# Patient Record
Sex: MALE | Race: WHITE | Employment: FULL TIME | URBAN - METROPOLITAN AREA
[De-identification: names, ages, dates, MRNs, and addresses within clinical notes are randomized per-mention and may not be internally consistent; named-entity substitution may affect disease eponyms.]

---

## 2017-02-08 DIAGNOSIS — Z98.61 CAD S/P PERCUTANEOUS CORONARY ANGIOPLASTY: Primary | ICD-10-CM

## 2017-02-08 DIAGNOSIS — I25.10 CAD S/P PERCUTANEOUS CORONARY ANGIOPLASTY: Primary | ICD-10-CM

## 2017-02-08 RX ORDER — PRASUGREL 10 MG/1
10 TABLET, FILM COATED ORAL DAILY
Qty: 30 TABLET | Refills: 1 | Status: SHIPPED | OUTPATIENT
Start: 2017-02-08 | End: 2017-03-13 | Stop reason: SDUPTHER

## 2017-02-08 RX ORDER — PRASUGREL HCL 10 MG
TABLET ORAL
Qty: 14 TABLET | Refills: 0 | Status: SHIPPED | OUTPATIENT
Start: 2017-02-08 | End: 2017-02-08 | Stop reason: SDUPTHER

## 2017-02-22 RX ORDER — METOPROLOL SUCCINATE 25 MG/1
TABLET, EXTENDED RELEASE ORAL
Qty: 15 TABLET | Refills: 0 | Status: SHIPPED | OUTPATIENT
Start: 2017-02-22 | End: 2017-03-13 | Stop reason: SDUPTHER

## 2017-03-13 ENCOUNTER — OFFICE VISIT (OUTPATIENT)
Dept: CARDIOLOGY CLINIC | Age: 52
End: 2017-03-13

## 2017-03-13 VITALS
SYSTOLIC BLOOD PRESSURE: 140 MMHG | BODY MASS INDEX: 32.33 KG/M2 | HEART RATE: 100 BPM | HEIGHT: 75 IN | WEIGHT: 260 LBS | DIASTOLIC BLOOD PRESSURE: 80 MMHG

## 2017-03-13 DIAGNOSIS — Z98.61 CAD S/P PERCUTANEOUS CORONARY ANGIOPLASTY: Primary | ICD-10-CM

## 2017-03-13 DIAGNOSIS — I10 ESSENTIAL HYPERTENSION: ICD-10-CM

## 2017-03-13 DIAGNOSIS — I25.10 CAD S/P PERCUTANEOUS CORONARY ANGIOPLASTY: Primary | ICD-10-CM

## 2017-03-13 PROCEDURE — 99214 OFFICE O/P EST MOD 30 MIN: CPT | Performed by: INTERNAL MEDICINE

## 2017-03-13 RX ORDER — ATORVASTATIN CALCIUM 40 MG/1
40 TABLET, FILM COATED ORAL DAILY
Qty: 90 TABLET | Refills: 3 | Status: SHIPPED | OUTPATIENT
Start: 2017-03-13 | End: 2018-04-05 | Stop reason: SDUPTHER

## 2017-03-13 RX ORDER — METOPROLOL SUCCINATE 25 MG/1
25 TABLET, EXTENDED RELEASE ORAL DAILY
Qty: 90 TABLET | Refills: 3 | Status: SHIPPED | OUTPATIENT
Start: 2017-03-13 | End: 2018-01-31 | Stop reason: ALTCHOICE

## 2017-03-13 RX ORDER — ISOSORBIDE MONONITRATE 30 MG/1
30 TABLET, EXTENDED RELEASE ORAL DAILY
Qty: 180 TABLET | Refills: 3 | Status: SHIPPED | OUTPATIENT
Start: 2017-03-13 | End: 2018-01-23

## 2017-03-13 RX ORDER — PRASUGREL 10 MG/1
10 TABLET, FILM COATED ORAL DAILY
Qty: 90 TABLET | Refills: 3 | Status: SHIPPED | OUTPATIENT
Start: 2017-03-13 | End: 2018-01-23 | Stop reason: HOSPADM

## 2017-03-13 RX ORDER — LISINOPRIL 20 MG/1
20 TABLET ORAL DAILY
Qty: 180 TABLET | Refills: 3 | Status: SHIPPED | OUTPATIENT
Start: 2017-03-13 | End: 2018-05-27 | Stop reason: SDUPTHER

## 2017-03-21 ENCOUNTER — TELEPHONE (OUTPATIENT)
Dept: CARDIOLOGY CLINIC | Age: 52
End: 2017-03-21

## 2018-01-15 ENCOUNTER — OFFICE VISIT (OUTPATIENT)
Dept: CARDIOLOGY CLINIC | Age: 53
End: 2018-01-15

## 2018-01-15 VITALS
WEIGHT: 250.2 LBS | SYSTOLIC BLOOD PRESSURE: 130 MMHG | HEART RATE: 58 BPM | BODY MASS INDEX: 31.27 KG/M2 | DIASTOLIC BLOOD PRESSURE: 80 MMHG

## 2018-01-15 DIAGNOSIS — Z98.61 CAD S/P PERCUTANEOUS CORONARY ANGIOPLASTY: Primary | ICD-10-CM

## 2018-01-15 DIAGNOSIS — I10 ESSENTIAL HYPERTENSION: ICD-10-CM

## 2018-01-15 DIAGNOSIS — I25.10 CAD S/P PERCUTANEOUS CORONARY ANGIOPLASTY: Primary | ICD-10-CM

## 2018-01-15 PROCEDURE — 99213 OFFICE O/P EST LOW 20 MIN: CPT | Performed by: INTERNAL MEDICINE

## 2018-01-15 NOTE — PROGRESS NOTES
vitamins capsule Take 1 capsule by mouth daily      Omega-3 Krill Oil 300 MG CAPS Take 300 mg by mouth daily      Cholecalciferol (VITAMIN D3) 2000 UNITS CAPS Take by mouth      Blood Glucose Monitoring Suppl SUPPLIES MISC verio IQ test strips qty 300 test tid 3 refills 300 each 3    glucose blood VI test strips ([employer] BLOOD GLUCOSE TEST) strip As needed. 100 strip 12    aspirin 81 MG EC tablet Take 81 mg by mouth daily.  prasugrel (EFFIENT) 10 MG TABS Take 1 tablet by mouth daily 90 tablet 3    magnesium 30 MG tablet Take 30 mg by mouth daily      metFORMIN (GLUCOPHAGE) 1000 MG tablet TAKE ONE TABLET BY MOUTH TWICE A  tablet 3     No current facility-administered medications for this visit. Vitals  Weight: 250 lb 3.2 oz (113.5 kg)  Blood Pressure:  140 /80  Pulse: 58           No diagnosis found. Review of Systems   Constitutional: Negative. Eyes: Negative. Respiratory:  Negative for apnea, cough, choking, wheezing and stridor. Cardiovascular: Positive for chest pain. Negative for palpitations and leg swelling. Gastrointestinal: Negative. Endocrine: Negative. Genitourinary: Negative. Musculoskeletal: Negative. Skin: Negative. Neurological: Negative. Psychiatric/Behavioral: Negative. Objective:   Physical Exam   Constitutional: He is oriented to person, place, and time. He appears well-developed and well-nourished. HENT:   Head: Normocephalic and atraumatic. Eyes: EOM are normal. Pupils are equal, round, and reactive to light. Neck: Normal range of motion. Neck supple. Cardiovascular: Normal rate and regular rhythm. Pulmonary/Chest: Effort normal and breath sounds normal.   Abdominal: Soft. Bowel sounds are normal. He exhibits no distension and no mass. There is no tenderness. There is no rebound and no guarding. Musculoskeletal: He exhibits no edema or tenderness. Neurological: He is alert and oriented to person, place, and time.

## 2018-01-19 ENCOUNTER — HOSPITAL ENCOUNTER (OUTPATIENT)
Dept: NON INVASIVE DIAGNOSTICS | Age: 53
Discharge: HOME | End: 2018-01-19
Attending: INTERNAL MEDICINE | Admitting: INTERNAL MEDICINE

## 2018-01-19 ENCOUNTER — TELEPHONE (OUTPATIENT)
Dept: CARDIOLOGY CLINIC | Age: 53
End: 2018-01-19

## 2018-01-19 DIAGNOSIS — I10 ESSENTIAL HYPERTENSION: ICD-10-CM

## 2018-01-19 DIAGNOSIS — Z98.61 CAD S/P PERCUTANEOUS CORONARY ANGIOPLASTY: ICD-10-CM

## 2018-01-19 DIAGNOSIS — I25.10 CAD S/P PERCUTANEOUS CORONARY ANGIOPLASTY: ICD-10-CM

## 2018-01-19 DIAGNOSIS — R94.39 ABNORMAL STRESS TEST: Primary | ICD-10-CM

## 2018-01-19 LAB
LV EF: 42 %
LVEF MODALITY: NORMAL

## 2018-01-19 RX ORDER — 0.9 % SODIUM CHLORIDE 0.9 %
10 VIAL (ML) INJECTION PRN
Status: DISCONTINUED | OUTPATIENT
Start: 2018-01-19 | End: 2018-01-20 | Stop reason: HOSPADM

## 2018-01-19 RX ADMIN — Medication 10 ML: at 13:24

## 2018-01-19 NOTE — TELEPHONE ENCOUNTER
Dr. Dav Funes called to inform Dr. Sukh Shine pt stress test is positive. Dr. Sukh Shine ordered left heart cath for 1/23/18 at 0900 and to increase IMDUR 30 mg to BID, and stay off work, letter given. Called and notified pt of orders, bring a list of your medications, pt denies allergies to contrast die and iodine, do not eat or drink anything 8 hours prior to procedure, take all morning medications excerpt for diuretics, check blood sugar in the morning if below 120 drink a glass of juice. stop metformin 2 days before procedure. you must have someone drive you home- no driving for 24 hours after the procedure. Pt verbalized understanding on all instructions.

## 2018-01-23 ENCOUNTER — HOSPITAL ENCOUNTER (OUTPATIENT)
Dept: CARDIAC CATH/INVASIVE PROCEDURES | Age: 53
Discharge: HOME | End: 2018-01-23
Attending: INTERNAL MEDICINE | Admitting: INTERNAL MEDICINE

## 2018-01-23 ENCOUNTER — TELEPHONE (OUTPATIENT)
Dept: CARDIOTHORACIC SURGERY | Age: 53
End: 2018-01-23

## 2018-01-23 VITALS — HEIGHT: 75 IN | TEMPERATURE: 97.3 F | WEIGHT: 244 LBS | BODY MASS INDEX: 30.34 KG/M2

## 2018-01-23 LAB
A/G RATIO: 1.5 (ref 1.1–2.2)
ALBUMIN SERPL-MCNC: 4.3 G/DL (ref 3.4–5)
ALP BLD-CCNC: 100 U/L (ref 40–129)
ALT SERPL-CCNC: 24 U/L (ref 10–40)
ANION GAP SERPL CALCULATED.3IONS-SCNC: 12 MMOL/L (ref 3–16)
AST SERPL-CCNC: 19 U/L (ref 15–37)
BILIRUB SERPL-MCNC: 0.6 MG/DL (ref 0–1)
BUN BLDV-MCNC: 20 MG/DL (ref 7–20)
CALCIUM SERPL-MCNC: 9.4 MG/DL (ref 8.3–10.6)
CHLORIDE BLD-SCNC: 101 MMOL/L (ref 99–110)
CO2: 23 MMOL/L (ref 21–32)
CREAT SERPL-MCNC: 0.7 MG/DL (ref 0.9–1.3)
GFR AFRICAN AMERICAN: >60
GFR NON-AFRICAN AMERICAN: >60
GLOBULIN: 2.9 G/DL
GLUCOSE BLD-MCNC: 221 MG/DL (ref 70–99)
HCT VFR BLD CALC: 42.9 % (ref 40.5–52.5)
HEMOGLOBIN: 14.9 G/DL (ref 13.5–17.5)
INR BLD: 0.96 (ref 0.85–1.15)
MCH RBC QN AUTO: 30.8 PG (ref 26–34)
MCHC RBC AUTO-ENTMCNC: 34.8 G/DL (ref 31–36)
MCV RBC AUTO: 88.5 FL (ref 80–100)
PDW BLD-RTO: 12.8 % (ref 12.4–15.4)
PLATELET # BLD: 267 K/UL (ref 135–450)
PMV BLD AUTO: 8.9 FL (ref 5–10.5)
POTASSIUM REFLEX MAGNESIUM: 4.3 MMOL/L (ref 3.5–5.1)
PROTHROMBIN TIME: 10.9 SEC (ref 9.6–13)
RBC # BLD: 4.85 M/UL (ref 4.2–5.9)
SODIUM BLD-SCNC: 136 MMOL/L (ref 136–145)
TOTAL PROTEIN: 7.2 G/DL (ref 6.4–8.2)
WBC # BLD: 6.4 K/UL (ref 4–11)

## 2018-01-23 PROCEDURE — 99999 PR OFFICE/OUTPT VISIT,PROCEDURE ONLY: CPT | Performed by: INTERNAL MEDICINE

## 2018-01-23 RX ORDER — SODIUM CHLORIDE 9 MG/ML
INJECTION, SOLUTION INTRAVENOUS CONTINUOUS
Status: DISCONTINUED | OUTPATIENT
Start: 2018-01-23 | End: 2018-01-24 | Stop reason: HOSPADM

## 2018-01-23 RX ORDER — SODIUM CHLORIDE 0.9 % (FLUSH) 0.9 %
10 SYRINGE (ML) INJECTION EVERY 12 HOURS SCHEDULED
Status: DISCONTINUED | OUTPATIENT
Start: 2018-01-23 | End: 2018-01-24 | Stop reason: HOSPADM

## 2018-01-23 RX ORDER — SODIUM CHLORIDE 9 MG/ML
INJECTION, SOLUTION INTRAVENOUS CONTINUOUS
Status: ACTIVE | OUTPATIENT
Start: 2018-01-23 | End: 2018-01-23

## 2018-01-23 RX ORDER — ONDANSETRON 2 MG/ML
4 INJECTION INTRAMUSCULAR; INTRAVENOUS EVERY 6 HOURS PRN
Status: DISCONTINUED | OUTPATIENT
Start: 2018-01-23 | End: 2018-01-24 | Stop reason: HOSPADM

## 2018-01-23 RX ORDER — SODIUM CHLORIDE 0.9 % (FLUSH) 0.9 %
10 SYRINGE (ML) INJECTION PRN
Status: DISCONTINUED | OUTPATIENT
Start: 2018-01-23 | End: 2018-01-24 | Stop reason: HOSPADM

## 2018-01-23 RX ORDER — SODIUM CHLORIDE 0.9 % (FLUSH) 0.9 %
10 SYRINGE (ML) INJECTION EVERY 12 HOURS SCHEDULED
Status: DISCONTINUED | OUTPATIENT
Start: 2018-01-23 | End: 2018-01-23 | Stop reason: SDUPTHER

## 2018-01-23 RX ORDER — ISOSORBIDE MONONITRATE 30 MG/1
30 TABLET, EXTENDED RELEASE ORAL 2 TIMES DAILY
Qty: 60 TABLET | Refills: 3 | Status: SHIPPED | OUTPATIENT
Start: 2018-01-23 | End: 2018-03-16 | Stop reason: DRUGHIGH

## 2018-01-23 RX ORDER — ACETAMINOPHEN 325 MG/1
650 TABLET ORAL EVERY 4 HOURS PRN
Status: DISCONTINUED | OUTPATIENT
Start: 2018-01-23 | End: 2018-01-24 | Stop reason: HOSPADM

## 2018-01-23 RX ORDER — SODIUM CHLORIDE 0.9 % (FLUSH) 0.9 %
10 SYRINGE (ML) INJECTION PRN
Status: DISCONTINUED | OUTPATIENT
Start: 2018-01-23 | End: 2018-01-23 | Stop reason: SDUPTHER

## 2018-01-23 RX ADMIN — Medication 325 MG: at 08:42

## 2018-01-23 NOTE — TELEPHONE ENCOUNTER
Pt sched for appt 1/29/18. Info emailed to [email or web address] as requested per pt. Reached out to [employer] for assistance in trying to bring wife from [place] for surgery.   Dorcas Delatorre

## 2018-01-24 NOTE — PLAN OF CARE
Brief Pre-Op Note/Sedation Assessment      Leidy Thomas  1965  Room/bed info not found  [id number]  11:13 AM    Planned Procedure: Cardiac Catheterization Procedure and possible PCi    Post Procedure Plan: Return to same level of care    Consent: I have discussed with the patient and/or the patient representative the indication, alternatives, and the possible risks and/or complications of the planned procedure and the anesthesia methods. The patient and/or patient representative appear to understand and agree to proceed.     Chief Complaint: Chest Pain/Pressure      Indications for the Procedure:   CAD Presentation:  Unstable Angina -  New onset angina (within the past 2 months of at least class III severity)  Anginal Classification within 2 weeks:  CCS III - Symptoms with everyday living activities, i.e., moderate limitation  NYHA Heart Failure Class within 2 weeks: Class II - Symptoms of HF on ordinary exertion       Anti- Anginal Meds within 2 weeks:   ANTI-ANGINAL MEDS: Yes: Beta Blockers      Stress or Imaging Studies Performed:  Stress Test with SPECT Result: Positive:  anterior Risk/Extent of Ischemia:  Intermediate    Vital Signs:  Temp 97.3 °F (36.3 °C) (Oral)   Ht 6' 3\" (1.905 m)   Wt 244 lb (110.7 kg)   BMI 30.50 kg/m²     Allergies:  No Known Allergies    Past Medical History:  Past Medical History:   Diagnosis Date    Hypertension     Type II or unspecified type diabetes mellitus without mention of complication, not stated as uncontrolled          Surgical History:  Past Surgical History:   Procedure Laterality Date    COLONOSCOPY  2011 2016 next    KNEE SURGERY Right 1999    bone chip removed    TONSILLECTOMY AND ADENOIDECTOMY  1970    VASECTOMY  1986         Medications:  Current Outpatient Prescriptions   Medication Sig Dispense Refill    isosorbide mononitrate (IMDUR) 30 MG extended release tablet Take 1 tablet by mouth 2 times daily 60 tablet 3    metoprolol succinate (TOPROL XL)

## 2018-01-25 LAB
EKG ATRIAL RATE: 75 BPM
EKG DIAGNOSIS: NORMAL
EKG P AXIS: 45 DEGREES
EKG P-R INTERVAL: 156 MS
EKG Q-T INTERVAL: 386 MS
EKG QRS DURATION: 102 MS
EKG QTC CALCULATION (BAZETT): 431 MS
EKG R AXIS: -14 DEGREES
EKG T AXIS: 38 DEGREES
EKG VENTRICULAR RATE: 75 BPM

## 2018-01-26 ENCOUNTER — HOSPITAL ENCOUNTER (OUTPATIENT)
Dept: VASCULAR LAB | Age: 53
Discharge: HOME | End: 2018-01-26
Attending: THORACIC SURGERY (CARDIOTHORACIC VASCULAR SURGERY) | Admitting: THORACIC SURGERY (CARDIOTHORACIC VASCULAR SURGERY)

## 2018-01-26 DIAGNOSIS — I25.10 ATHEROSCLEROTIC HEART DISEASE OF NATIVE CORONARY ARTERY WITHOUT ANGINA PECTORIS: ICD-10-CM

## 2018-01-29 ENCOUNTER — TELEPHONE (OUTPATIENT)
Dept: CARDIOLOGY CLINIC | Age: 53
End: 2018-01-29

## 2018-01-29 ENCOUNTER — OFFICE VISIT (OUTPATIENT)
Dept: CARDIOTHORACIC SURGERY | Age: 53
End: 2018-01-29

## 2018-01-29 VITALS
HEIGHT: 75 IN | WEIGHT: 248 LBS | BODY MASS INDEX: 30.84 KG/M2 | OXYGEN SATURATION: 96 % | HEART RATE: 88 BPM | SYSTOLIC BLOOD PRESSURE: 118 MMHG | TEMPERATURE: 97.9 F | DIASTOLIC BLOOD PRESSURE: 80 MMHG

## 2018-01-29 DIAGNOSIS — I25.10 CAD S/P PERCUTANEOUS CORONARY ANGIOPLASTY: ICD-10-CM

## 2018-01-29 DIAGNOSIS — E11.9 TYPE 2 DIABETES MELLITUS WITHOUT COMPLICATION, UNSPECIFIED LONG TERM INSULIN USE STATUS: ICD-10-CM

## 2018-01-29 DIAGNOSIS — Z98.61 CAD S/P PERCUTANEOUS CORONARY ANGIOPLASTY: ICD-10-CM

## 2018-01-29 DIAGNOSIS — I10 ESSENTIAL HYPERTENSION: ICD-10-CM

## 2018-01-29 DIAGNOSIS — I25.110 CORONARY ARTERY DISEASE INVOLVING NATIVE CORONARY ARTERY OF NATIVE HEART WITH UNSTABLE ANGINA PECTORIS (HCC): Primary | ICD-10-CM

## 2018-01-29 PROBLEM — E78.5 HYPERLIPIDEMIA: Status: ACTIVE | Noted: 2018-01-29

## 2018-01-29 PROCEDURE — 99214 OFFICE O/P EST MOD 30 MIN: CPT | Performed by: THORACIC SURGERY (CARDIOTHORACIC VASCULAR SURGERY)

## 2018-01-29 NOTE — TELEPHONE ENCOUNTER
Office received paper work from [employer] regarding disability claim. Please see MEDIA TAB for scanned copy. [employer]  Ph:) [phone]  Fax:) [phone]    Claim Disability [id number]    Original paperwork given to Maricel Moore for completion.

## 2018-01-29 NOTE — PROGRESS NOTES
2/24/16  Yes Az Smyth, DO   LEVEMIR FLEXTOUCH 100 UNIT/ML injection pen INJECT 50 UNITS UNDER THE SKIN NIGHTLY 11/5/15  Yes Az Smyth DO   Blood Glucose Monitoring Suppl SUPPLIES MISC verio IQ test strips qty 300 test tid 3 refills 5/1/14  Yes Az Smyth DO   glucose blood VI test strips ([employer] BLOOD GLUCOSE TEST) strip As needed. 9/11/13  Yes Az Smyth DO   aspirin 81 MG EC tablet Take 81 mg by mouth daily. Yes Historical Provider, MD        Facility Administered Medications: Allergies:  Review of patient's allergies indicates no known allergies. Social History:      Social History     Social History    Marital status:      Spouse name: N/A    Number of children: N/A    Years of education: N/A     Occupational History    Not on file. Social History Main Topics    Smoking status: Never Smoker    Smokeless tobacco: Never Used    Alcohol use 0.0 oz/week      Comment: rarely    Drug use: No    Sexual activity: Yes     Partners: Female     Other Topics Concern    Not on file     Social History Narrative    No narrative on file       Family History:        Problem Relation Age of Onset    Cancer Mother      lung    Diabetes Father     Heart Disease Father     Alzheimer's Disease Maternal Grandmother        Review of Systems:  Constitutional:  No night sweats, headaches, weight loss. Neurological:  No stroke, TIAs, seizures. Psychiatric:  No depression, anxiety. Eyes:  No glaucoma, cataracts. ENT:  No nosebleeds, deviated septum. Integumentary:  No dermatitis, itching, rash. Cardiovascular:  No arrhythmias, previous MI. Respiratory:  No SOB, emphysema, asthma. GI:  No PUD, heartburn. :  No kidney stones, frequent UTIs  Vascular:  No claudication, varicosities. Hematologic:  No bleeding, easy bruising. Immunologic:  No known cancer, steroid therapies. Musculoskeletal:  No arthritis, gout. Endocrine: No diabetes, thyroid issues.     Physical Examination: /80 (Site: Left Arm, Position: Sitting, Cuff Size: Medium Adult)   Pulse 88   Temp 97.9 °F (36.6 °C) (Oral)   Ht 6' 3\" (1.905 m)   Wt 248 lb (112.5 kg)   SpO2 96%   BMI 31.00 kg/m²    BP RUE:  BP LUE:   Admission Weight: 248 lb (112.5 kg)   Hand dominance:    General appearance: NAD  Eyes: PERRLA  Neck: no JVD, no lymphadenopathy. Respiratory: effort is unlabored, no crackles, wheezes or rubs. Cardiovascular: regular, no murmur. No carotid bruits. No edema or varicosities. Abdominal aorta cannot be appreciated given body habitus. Pulses:    carotid brachial radial femoral popliteal DP PT   RIGHT          LEFT          GI: abdomen soft, nondistended, no organomegaly. Musculoskeletal: strength and tone normal.  Extremities: warm and pink. Skin: no dermatitis or ulceration. Neuro/psychiatric: grossly intact. MEDICAL DECISION MAKING/TESTING personally reviewed    Cath:  diagonal significant circumflex PLB disease. There is PDA disease that is mild. The LAD looks like it may have a 50% lesion but only in one view. I'm unable to detect an stenosis in a second view          Labs:   CBC: No results for input(s): WBC, HGB, HCT, MCV, PLT in the last 72 hours. BMP: No results for input(s): NA, K, CL, CO2, PHOS, BUN, CREATININE, CALCIUM, MG in the last 72 hours. Cardiac Enzymes: No results for input(s): CKTOTAL, CKMB, CKMBINDEX, TROPONINI in the last 72 hours. PT/INR: No results for input(s): PROTIME, INR in the last 72 hours. APTT: No results for input(s): APTT in the last 72 hours.   Liver Profile:  Lab Results   Component Value Date    AST 19 01/23/2018    ALT 24 01/23/2018    BILIDIR <0.2 08/21/2015    BILITOT 0.6 01/23/2018    ALKPHOS 100 01/23/2018     Lab Results   Component Value Date    CHOL 94 10/16/2015    HDL 38 10/16/2015    TRIG 142 10/16/2015     TSH:  No results found for: TSH  UA:   Lab Results   Component Value Date    NITRITE n 08/31/2015    PHUR 5.5 08/31/2015    SPECGRAV 1.030 08/31/2015    LEUKOCYTESUR n 08/31/2015    BILIRUBINUR n 08/31/2015    BLOODU n 08/31/2015    GLUCOSEU 500mg/dL 08/31/2015       History obtained: chart, pt    Diagnosis:  Multivessel coronary disease. I'm uncertain of the severity of the LAD lesion. Plan:   Discussed with Dr. Mohan Acosta and we will plan to have him undergo FFR in the Cath Lab on Wednesday afternoon. If that FFR is positive then we will plan coronary artery bypass grafting on Thursday morning. I discussed all risks, benefits, and alternatives to operative intervention regarding coronary revascularization. He agrees to proceed if the FFR is positive. Typical periop/postop course reviewed including initial limitations on driving/heavy lifting. Risks, benefits and postoperative complications discussed including bleeding, infection, stroke, death, postop pulmonary and renal issues.

## 2018-01-29 NOTE — TELEPHONE ENCOUNTER
Called pt instructions given. Bring a list of your medications, pt denies allergies to contrast die and iodine, do not eat or drink anything 8 hours prior to procedure, take all morning medications excerpt for diuretics,**check blood sugar in the morning if 120 or less do not take insulin and drink a small glass of juice, pt has stopped metformin. you must have someone drive you home- no driving for 24 hours after the procedure. Pt verbalized understanding on all instructions.

## 2018-01-30 NOTE — PROCEDURES
[address] [address] [facility] [address]                 [address] [place], [address]                                PULMONARY FUNCTION    PATIENT NAME: Boom MATUTE                        :        1965  MED REC NO:   [id number]                          ROOM:  ACCOUNT NO:   [de-identified]                          ADMIT DATE: 2018  PROVIDER:     Peter Santiago MD    DATE OF PROCEDURE:  2018    Spirometry on this patient shows an FEV1 of 4.71, which is 100% of  predicted. Forced vital capacity of 5.87, which is 95% of predicted. Lung  volumes are in the normal range as is diffusion.     CONCLUSION:  Normal pulmonary function test.        Dillon Ng MD    D: 2018 12:35:22       T: 2018 21:46:11     [id number]  Job#: [id number]     Doc#: [id number]    CC:

## 2018-01-31 PROBLEM — I25.10 CAD IN NATIVE ARTERY: Status: ACTIVE | Noted: 2018-01-31

## 2018-01-31 RX ORDER — CHLORHEXIDINE GLUCONATE 0.12 MG/ML
15 RINSE ORAL ONCE
Status: CANCELLED | OUTPATIENT
Start: 2018-01-31 | End: 2018-01-31

## 2018-01-31 RX ORDER — SODIUM CHLORIDE 0.9 % (FLUSH) 0.9 %
10 SYRINGE (ML) INJECTION PRN
Status: CANCELLED | OUTPATIENT
Start: 2018-01-31

## 2018-01-31 RX ORDER — SODIUM CHLORIDE 0.9 % (FLUSH) 0.9 %
10 SYRINGE (ML) INJECTION EVERY 12 HOURS SCHEDULED
Status: CANCELLED | OUTPATIENT
Start: 2018-01-31

## 2018-01-31 RX ORDER — CHLORHEXIDINE GLUCONATE 4 G/100ML
SOLUTION TOPICAL ONCE
Status: CANCELLED | OUTPATIENT
Start: 2018-01-31 | End: 2018-01-31

## 2018-01-31 RX ORDER — SODIUM CHLORIDE, SODIUM LACTATE, POTASSIUM CHLORIDE, CALCIUM CHLORIDE 600; 310; 30; 20 MG/100ML; MG/100ML; MG/100ML; MG/100ML
INJECTION, SOLUTION INTRAVENOUS CONTINUOUS
Status: CANCELLED | OUTPATIENT
Start: 2018-01-31

## 2018-01-31 RX ORDER — SODIUM CHLORIDE 9 MG/ML
INJECTION, SOLUTION INTRAVENOUS CONTINUOUS
Status: CANCELLED | OUTPATIENT
Start: 2018-01-31

## 2018-01-31 NOTE — ANESTHESIA PRE-OP
by   Radiology.      Impression:   <1mm ST depression with exercise with development of IVCD at peak.   Nuclear portion will be reported separately by Radiology.      Signature      ------------------------------------------------------------------   Electronically signed by Mitch Prado MD (Interpreting   physician) on 01/19/2018 at 03:03 PM   ------------------------------------------------------------------      Rest      ECG   Normal sinus rhythm.   Normal ECG.   VT interval:0.12   QRS duration:0.06   Axis:normal.      Stress      Stress Type: Exercise                                       HR BP Product: 66905   Stress Peak HR: 164 bpm   Stress Peak BP: 186/93 mmHg   Predicted HR: 166 bpm   % of predicted HR: 99   Test Duration: 10 min and 20 sec   Reason for Termination: Fatigue      Results      ECG   <1mm ST depression with exercise with development of IVCD at peak.      Arrhythmias   No significant arrhythmia was observed.      Symptoms   No chest pain.       Specimen Collected: 01/19/18 14:19 Last Resulted: 01/19/18 15:03 Order Details View Encounter Lab and Collection Details Routing          Reviewed By Antonieta Hatfield RN on 1/22/2018 11:44  Jj Hatfield RN on 1/22/2018 11:44  Routing History     Priority Sent On From To Message Type   1/19/2018  3:33 PM Tee, Swoh Incoming Radiology Results From Florencio Victor MD Results   1/19/2018  3:03 PM Tee, Swoh Incoming Cardiovascular Orders From [facility] William Cooper MD Results  Result Information     Status: Final result (Resulted: 1/19/2018 15:03) Provider Status: Reviewed  PACS Images     Show images for Stress Test W Pharm  Stress Test Allyson Joseph ([id number])     (Order #: [id number])  Reprint Requisition     Stress Test Allyson Joseph (Order #[id number]) on 1/19/18  Order Information     Order Date/Time Release Date/Time Start Date/Time End Date/Time  01/19/18 02:50 PM 01/19/18 02:50 PM 01/19/18 03:00 PM 01/19/18 03:00 PM  Order Details Frequency Duration Priority Order Class  ONE TIME 1  occurrence Routine Hospital Performed  Original Order     Ordered On Ordered By   2018 2:50 PM Willard Morales            Order Providers     Authorizing Encounter Billing  Maggy Victor MD [facility] STRESS ROOM - Mary Magallanes MD       Reviewed By List     Marlee Tracy, RN on 2018 11:44  Marlee Tracy RN on 2018 11:44  Comments     CAD       Order Questions     Question Answer Comment  Reason for Exam? Hypertension        Collection Information     Collected: 2018  2:19 PM    Verbal Order Info     Action Created on Order Mode Entered by Responsible Provider Signed by Signed on  Ordering 18 [address] [place] [address]  Signature Not Required   Patient Information     Patient Name  Monica Macdonald Sex  Male   1965 SSN  [id number]  Additional Information     Associated Reports  View Encounter  Priority and Order Details  Collection Information  Click to Print Result   Scheduled for left heart cath at [facility] 1 pm 18       Neuro/Psych:               GI/Hepatic/Renal:             Endo/Other:    (+) Type II DM, , .                 Abdominal:           Vascular:                                      Anesthesia Plan      general     ASA 4         arterial line, central line, BIS, CVP, PA catheter and JOSH    Anesthetic plan and risks discussed with patient. Use of blood products discussed with patient whom consented to blood products.                    Mila Severino MD   2018

## 2018-02-01 ENCOUNTER — HOSPITAL ENCOUNTER (OUTPATIENT)
Dept: SURGERY | Age: 53
Discharge: HOME | End: 2018-02-20
Attending: THORACIC SURGERY (CARDIOTHORACIC VASCULAR SURGERY) | Admitting: THORACIC SURGERY (CARDIOTHORACIC VASCULAR SURGERY)

## 2018-02-01 NOTE — PROCEDURES
pinched at  the ostium. Second septal  comes off at the end of the stent and  it is angiographically normal.  Beyond the stented segment there is a focal  70% narrowing near a bend. There is some contrast hang up or sluggish flow  just before the bend and _____ 70% narrowing. Beyond this the mid LAD is  unremarkable and then the distal LAD after a large diagonal branch is  diffusely narrowed and diseased. The LAD coursed around the apex, near the  apex of left ventricle, but it is diffusely severely diseased. There is no  focal obstruction, but there is AZAR III flow and it is very small in  caliber. The circumflex coronary artery proximally is normal.  There is about a 20%  narrowing proximally. It is normal at the ostium, proximal 20% stenosis  and then after the first bend there is a focal hazy stenosis of about  30-40%. Then there is a slight aneurysmal area and then there is another  bend and at that bend there is a focal 80% stenosis. The circumflex goes  on to give off two obtuse marginal branches, they are small in caliber and  free of obstructive disease. JR-4 catheter engages the right coronary artery. Injection of the right  coronary artery shows proximally normal and in mid vessel there is a focal  20-30% narrowing, this is within the stent. Then beyond this there is a  30% narrowing near the RV margin. There are stents in the right coronary  artery that are overall fairly well patent with AZAR grade 3 flow. The  right posterolateral branch ostium has a 90% narrowing and then in that  posterolateral branch bifurcation there is heavy disease at that  bifurcation approaching that bifurcation up to 90%. The superior branch of  the right posterolateral branch far downstream is diffusely diseased and  then has a focal 90% stenosis. The right posterior descending coronary  artery at the ostium has about a 30% narrowing at the takeoff of that right  posterolateral branch.   There is

## 2018-02-07 ENCOUNTER — TELEPHONE (OUTPATIENT)
Dept: CARDIOLOGY CLINIC | Age: 53
End: 2018-02-07

## 2018-02-12 ENCOUNTER — TELEPHONE (OUTPATIENT)
Dept: CARDIOLOGY CLINIC | Age: 53
End: 2018-02-12

## 2018-02-13 NOTE — TELEPHONE ENCOUNTER
Called and spoke to pt. Called and spoke to Nahed notified Stress test results faxed with a date of 1/19/18 and a follow up apt. On 2/16/18.

## 2018-02-16 ENCOUNTER — OFFICE VISIT (OUTPATIENT)
Dept: CARDIOLOGY CLINIC | Age: 53
End: 2018-02-16

## 2018-02-16 VITALS
HEART RATE: 72 BPM | BODY MASS INDEX: 32.87 KG/M2 | DIASTOLIC BLOOD PRESSURE: 58 MMHG | WEIGHT: 263 LBS | SYSTOLIC BLOOD PRESSURE: 128 MMHG

## 2018-02-16 DIAGNOSIS — I10 ESSENTIAL HYPERTENSION: ICD-10-CM

## 2018-02-16 DIAGNOSIS — E78.00 PURE HYPERCHOLESTEROLEMIA: ICD-10-CM

## 2018-02-16 DIAGNOSIS — I25.10 CORONARY ARTERY DISEASE INVOLVING NATIVE CORONARY ARTERY OF NATIVE HEART WITHOUT ANGINA PECTORIS: Primary | ICD-10-CM

## 2018-02-16 DIAGNOSIS — Z98.61 S/P PTCA (PERCUTANEOUS TRANSLUMINAL CORONARY ANGIOPLASTY): ICD-10-CM

## 2018-02-16 PROCEDURE — 99214 OFFICE O/P EST MOD 30 MIN: CPT | Performed by: NURSE PRACTITIONER

## 2018-02-16 NOTE — PROGRESS NOTES
CC/HPI:  45 y.o. patient of Dr. Danielle Perry who recently had PCI to LCX with 3.5 x 8 XIence DANA. He denies cp, sob, LH/dizziness, palpitations or syncope. No LE edema or GI/ bleeding. Tolerating medications. Tolerating activity. C/O right groin tenderness but denies bleeding or numbness. Past Medical History:   Diagnosis Date    CAD (coronary artery disease)     Hyperlipidemia     Hypertension     Type II or unspecified type diabetes mellitus without mention of complication, not stated as uncontrolled      Past Surgical History:   Procedure Laterality Date    COLONOSCOPY  2011 2016 next    KNEE SURGERY Right 1999    bone chip removed    PTCA      Fall 2016    TONSILLECTOMY AND ADENOIDECTOMY  1970    VASECTOMY  1986     Family History   Problem Relation Age of Onset    Cancer Mother      lung    Diabetes Father     Heart Disease Father     Alzheimer's Disease Maternal Grandmother      Social History   Substance Use Topics    Smoking status: Never Smoker    Smokeless tobacco: Never Used    Alcohol use 0.0 oz/week      Comment: rarely     Allergies:Review of patient's allergies indicates no known allergies. Review of Systems  General: No changes in weight, fatigue, or night sweats. HEENT: No blurry or decreased vision. No changes in hearing, nasal discharge or sore throat. Cardiovascular:  See HPI. Respiratory: No cough, hemoptysis, or wheezing. No history of asthma. Gastrointestinal:  No abdominal pain, hematochezia, melana, constipation, diarrhea, or history of GI ulcers. Genito-Urinary: No dysuria or hematuria. No urgency or polyuria. Musculoskeletal:  No complaints of joint pain, joint swelling or muscular weakness/soreness. Neurological:  No dizziness, headaches, numbness/tingling, speech problems or weakness. No history of a stroke or TIA. Psychological:  No anxiety or depression.   Hematological and Lymphatic: No abnormal bleeding or bruising, blood clots, jaundice or swollen lymph nodes. Endocrine:   No malaise/lethargy, palpitations, polydipsia/polyuria, temperature intolerance or unexpected weight changes. +DM  Skin:  No rashes or non-healing ulcers. Physical Exam:  BP (!) 128/58   Pulse 72   Wt 263 lb (119.3 kg)   BMI 32.87 kg/m²    General:  Alert and oriented. No acute distress. Appears comfortable. HEENT:  Normocephalic. No trauma. EOMI. Neck:  Supple, no JVD  Cardiovascular: RRR   Pulses: 2+ radial and right femoral   Lungs:  Clear to auscultation. No rales, wheezes, or rhonchi. Abd:  Soft, non-tender, non-distended. No peritoneal signs. Ext:  No clubbing, cyanosis, or edema. Right groin soft, no hematoma   Neuro:  CN's 2-12 grossly in tact. Gait normal.  Motor and sensory exams grossly normal.  Skin:  No rashes or skin breakdown.     CBC:   Lab Results   Component Value Date    WBC 7.2 02/01/2018    HGB 13.3 (L) 02/01/2018    HCT 38.8 (L) 02/01/2018    MCV 88.5 02/01/2018     02/01/2018     BMP:  Lab Results   Component Value Date    CREATININE 0.6 (L) 01/31/2018    BUN 21 (H) 01/31/2018     01/31/2018    K 4.7 01/31/2018     01/31/2018    CO2 25 01/31/2018     Mag:   Lab Results   Component Value Date    MG 1.60 01/31/2018     LIVER PROFILE:   Lab Results   Component Value Date    ALT 23 01/31/2018    AST 22 01/31/2018    ALKPHOS 77 01/31/2018    BILITOT 0.7 01/31/2018     PT/INR:   Lab Results   Component Value Date    INR 0.97 01/31/2018    INR 0.96 01/23/2018    INR 0.92 10/14/2015    PROTIME 11.0 01/31/2018    PROTIME 10.9 01/23/2018    PROTIME 9.9 10/14/2015     BNP:  No results found for: BNP  LIPIDS:  No components found for: CHLPL  Lab Results   Component Value Date    TRIG 102 02/01/2018    TRIG 74 01/31/2018    TRIG 142 10/16/2015     Lab Results   Component Value Date    HDL 50 02/01/2018    HDL 63 (H) 01/31/2018    HDL 38 (L) 10/16/2015     Lab Results   Component Value Date    LDLCALC 46 02/01/2018    LDLCALC 49 01/31/2018

## 2018-02-27 ENCOUNTER — TELEPHONE (OUTPATIENT)
Dept: CARDIOLOGY CLINIC | Age: 53
End: 2018-02-27

## 2018-04-05 RX ORDER — ATORVASTATIN CALCIUM 40 MG/1
TABLET, FILM COATED ORAL
Qty: 90 TABLET | Refills: 3 | Status: SHIPPED | OUTPATIENT
Start: 2018-04-05 | End: 2019-03-31 | Stop reason: SDUPTHER

## 2018-04-27 RX ORDER — METOPROLOL SUCCINATE 25 MG/1
TABLET, EXTENDED RELEASE ORAL
Qty: 90 TABLET | Refills: 3 | Status: SHIPPED | OUTPATIENT
Start: 2018-04-27 | End: 2018-08-01 | Stop reason: SDUPTHER

## 2018-05-31 RX ORDER — LISINOPRIL 20 MG/1
TABLET ORAL
Qty: 90 TABLET | Refills: 1 | Status: SHIPPED | OUTPATIENT
Start: 2018-05-31

## 2018-08-01 ENCOUNTER — OFFICE VISIT (OUTPATIENT)
Dept: CARDIOLOGY CLINIC | Age: 53
End: 2018-08-01

## 2018-08-01 VITALS
BODY MASS INDEX: 33.15 KG/M2 | HEART RATE: 70 BPM | DIASTOLIC BLOOD PRESSURE: 74 MMHG | SYSTOLIC BLOOD PRESSURE: 138 MMHG | WEIGHT: 265.2 LBS

## 2018-08-01 DIAGNOSIS — I25.10 CORONARY ARTERY DISEASE INVOLVING NATIVE CORONARY ARTERY OF NATIVE HEART WITHOUT ANGINA PECTORIS: Primary | ICD-10-CM

## 2018-08-01 PROCEDURE — 99213 OFFICE O/P EST LOW 20 MIN: CPT | Performed by: INTERNAL MEDICINE

## 2018-08-01 RX ORDER — ISOSORBIDE MONONITRATE 30 MG/1
30 TABLET, EXTENDED RELEASE ORAL 2 TIMES DAILY
Qty: 180 TABLET | Refills: 3 | Status: SHIPPED | OUTPATIENT
Start: 2018-08-01

## 2018-08-01 RX ORDER — PRASUGREL 10 MG/1
10 TABLET, FILM COATED ORAL DAILY
Qty: 90 TABLET | Refills: 3 | Status: SHIPPED | OUTPATIENT
Start: 2018-08-01 | End: 2019-07-09 | Stop reason: SDUPTHER

## 2018-08-01 RX ORDER — METOPROLOL SUCCINATE 25 MG/1
TABLET, EXTENDED RELEASE ORAL
Qty: 90 TABLET | Refills: 3 | Status: SHIPPED | OUTPATIENT
Start: 2018-08-01 | End: 2019-03-29 | Stop reason: SDUPTHER

## 2018-08-01 NOTE — PROGRESS NOTES
Subjective:      Patient ID: Fran Pickard is a 52 y.o. male. CC:  Follow up CAD with coronary stenting. HPI:  Has left arm pain sometimes with heavy exertion but no chest pressure. He is not SOB and has no orthopnea nor palpitations. No Known Allergies     Social History     Social History    Marital status:      Spouse name: N/A    Number of children: N/A    Years of education: N/A     Occupational History    Not on file. Social History Main Topics    Smoking status: Never Smoker    Smokeless tobacco: Never Used    Alcohol use 0.0 oz/week      Comment: rarely    Drug use: No    Sexual activity: Yes     Partners: Female     Other Topics Concern    Not on file     Social History Narrative    No narrative on file        Patient has a family history includes Alzheimer's Disease in his maternal grandmother; Cancer in his mother; Diabetes in his father; Heart Disease in his father. Patient  has a past medical history of CAD (coronary artery disease); Hyperlipidemia; Hypertension; and Type II or unspecified type diabetes mellitus without mention of complication, not stated as uncontrolled.      Current Outpatient Prescriptions   Medication Sig Dispense Refill    lisinopril (PRINIVIL;ZESTRIL) 20 MG tablet TAKE 1 TABLET DAILY 90 tablet 1    metoprolol succinate (TOPROL XL) 25 MG extended release tablet TAKE 1 TABLET DAILY 90 tablet 3    atorvastatin (LIPITOR) 40 MG tablet TAKE 1 TABLET DAILY 90 tablet 3    isosorbide mononitrate (IMDUR) 30 MG extended release tablet Take 1 tablet by mouth 2 times daily 180 tablet 3    linagliptin (TRADJENTA) 5 MG tablet Take 1 tablet by mouth daily 30 tablet 3    prasugrel (EFFIENT) 10 MG TABS Take 1 tablet by mouth daily 30 tablet 3    metoprolol succinate (TOPROL XL) 50 MG extended release tablet Take 1 tablet by mouth daily 30 tablet 3    JANUVIA 100 MG tablet TAKE 1 TABLET DAILY 90 tablet 2    LEVEMIR FLEXTOUCH 100 UNIT/ML injection pen INJECT 50 UNITS UNDER THE SKIN NIGHTLY 3 Pen 3    Blood Glucose Monitoring Suppl SUPPLIES MISC verio IQ test strips qty 300 test tid 3 refills 300 each 3    glucose blood VI test strips ([employer] BLOOD GLUCOSE TEST) strip As needed. 100 strip 12    aspirin 81 MG EC tablet Take 81 mg by mouth 2 times daily        No current facility-administered medications for this visit. Vitals  Weight: 265 lb 3.2 oz (120.3 kg)  Blood Pressure:  140 /80  Pulse: 70     Review of Systems   Constitutional: Negative. Eyes: Negative. Respiratory:  Negative for apnea, cough, choking, wheezing and stridor. Cardiovascular: Positive for chest pain. Negative for palpitations and leg swelling. Gastrointestinal: Negative. Endocrine: Negative. Genitourinary: Negative. Musculoskeletal: Negative. Skin: Negative. Neurological: Negative. Psychiatric/Behavioral: Negative. Objective:   Physical Exam   Constitutional: He is oriented to person, place, and time. He appears well-developed and well-nourished. HENT:   Head: Normocephalic and atraumatic. Eyes: EOM are normal. Pupils are equal, round, and reactive to light. Neck: Normal range of motion. Neck supple. Cardiovascular: Normal rate and regular rhythm. Pulmonary/Chest: Effort normal and breath sounds normal.   Abdominal: Soft. Bowel sounds are normal. He exhibits no distension and no mass. There is no tenderness. There is no rebound and no guarding. Musculoskeletal: He exhibits no edema or tenderness. Neurological: He is alert and oriented to person, place, and time. Skin: Skin is warm and dry. Psychiatric: He has a normal mood and affect. His behavior is normal. Judgment and thought content normal.       Assessment:       CAD  HTN       Plan:        S/p stenting of the left Cx. No chest pain but arm pain now and then. Renewed meds and keep taking effient.

## 2019-02-18 ENCOUNTER — APPOINTMENT (OUTPATIENT)
Dept: CT IMAGING | Age: 54
End: 2019-02-18
Payer: OTHER MISCELLANEOUS

## 2019-02-18 ENCOUNTER — HOSPITAL ENCOUNTER (EMERGENCY)
Age: 54
Discharge: HOME | End: 2019-02-18
Attending: EMERGENCY MEDICINE
Payer: OTHER MISCELLANEOUS

## 2019-02-18 VITALS
OXYGEN SATURATION: 100 % | DIASTOLIC BLOOD PRESSURE: 101 MMHG | SYSTOLIC BLOOD PRESSURE: 163 MMHG | TEMPERATURE: 97.6 F | RESPIRATION RATE: 18 BRPM | HEART RATE: 70 BPM

## 2019-02-18 DIAGNOSIS — M54.12 CERVICAL RADICULOPATHY: Primary | ICD-10-CM

## 2019-02-18 LAB
ANION GAP SERPL CALCULATED.3IONS-SCNC: 17 MMOL/L (ref 3–16)
BUN BLDV-MCNC: 22 MG/DL (ref 7–20)
CALCIUM SERPL-MCNC: 9.2 MG/DL (ref 8.3–10.6)
CHLORIDE BLD-SCNC: 99 MMOL/L (ref 99–110)
CO2: 17 MMOL/L (ref 21–32)
CREAT SERPL-MCNC: 0.8 MG/DL (ref 0.9–1.3)
EKG ATRIAL RATE: 72 BPM
EKG DIAGNOSIS: NORMAL
EKG P AXIS: 5 DEGREES
EKG P-R INTERVAL: 140 MS
EKG Q-T INTERVAL: 384 MS
EKG QRS DURATION: 106 MS
EKG QTC CALCULATION (BAZETT): 420 MS
EKG R AXIS: -26 DEGREES
EKG T AXIS: -5 DEGREES
EKG VENTRICULAR RATE: 72 BPM
GFR AFRICAN AMERICAN: >60
GFR NON-AFRICAN AMERICAN: >60
GLUCOSE BLD-MCNC: 275 MG/DL (ref 70–99)
POTASSIUM REFLEX MAGNESIUM: 4.7 MMOL/L (ref 3.5–5.1)
PRO-BNP: 80 PG/ML (ref 0–124)
SODIUM BLD-SCNC: 133 MMOL/L (ref 136–145)
TROPONIN: <0.01 NG/ML

## 2019-02-18 PROCEDURE — 93005 ELECTROCARDIOGRAM TRACING: CPT | Performed by: EMERGENCY MEDICINE

## 2019-02-18 PROCEDURE — 80048 BASIC METABOLIC PNL TOTAL CA: CPT

## 2019-02-18 PROCEDURE — 99284 EMERGENCY DEPT VISIT MOD MDM: CPT

## 2019-02-18 PROCEDURE — 6360000002 HC RX W HCPCS: Performed by: EMERGENCY MEDICINE

## 2019-02-18 PROCEDURE — 96372 THER/PROPH/DIAG INJ SC/IM: CPT

## 2019-02-18 PROCEDURE — 72125 CT NECK SPINE W/O DYE: CPT

## 2019-02-18 PROCEDURE — 84484 ASSAY OF TROPONIN QUANT: CPT

## 2019-02-18 PROCEDURE — 83880 ASSAY OF NATRIURETIC PEPTIDE: CPT

## 2019-02-18 RX ORDER — KETOROLAC TROMETHAMINE 30 MG/ML
15 INJECTION, SOLUTION INTRAMUSCULAR; INTRAVENOUS ONCE
Status: COMPLETED | OUTPATIENT
Start: 2019-02-18 | End: 2019-02-18

## 2019-02-18 RX ORDER — DIAZEPAM 5 MG/1
5 TABLET ORAL EVERY 8 HOURS PRN
Qty: 20 TABLET | Refills: 0 | Status: SHIPPED | OUTPATIENT
Start: 2019-02-18 | End: 2019-02-21

## 2019-02-18 RX ORDER — IBUPROFEN 800 MG/1
800 TABLET ORAL EVERY 8 HOURS PRN
Qty: 120 TABLET | Refills: 0 | Status: SHIPPED | OUTPATIENT
Start: 2019-02-18

## 2019-02-18 RX ADMIN — KETOROLAC TROMETHAMINE 15 MG: 30 INJECTION, SOLUTION INTRAMUSCULAR; INTRAVENOUS at 04:38

## 2019-02-18 ASSESSMENT — PAIN DESCRIPTION - PAIN TYPE: TYPE: ACUTE PAIN

## 2019-02-18 ASSESSMENT — PAIN DESCRIPTION - ORIENTATION: ORIENTATION: LEFT

## 2019-02-18 ASSESSMENT — PAIN SCALES - GENERAL
PAINLEVEL_OUTOF10: 8
PAINLEVEL_OUTOF10: 8

## 2019-02-18 ASSESSMENT — PAIN DESCRIPTION - LOCATION: LOCATION: NECK;SHOULDER

## 2019-02-18 ASSESSMENT — ENCOUNTER SYMPTOMS
SHORTNESS OF BREATH: 0
NAUSEA: 0
COUGH: 0
EYE PAIN: 0
WHEEZING: 0
ABDOMINAL PAIN: 0
DIARRHEA: 0
VOMITING: 0

## 2019-02-18 ASSESSMENT — PAIN DESCRIPTION - DESCRIPTORS: DESCRIPTORS: ACHING

## 2019-03-29 ENCOUNTER — OFFICE VISIT (OUTPATIENT)
Dept: CARDIOLOGY CLINIC | Age: 54
End: 2019-03-29
Payer: COMMERCIAL

## 2019-03-29 VITALS
DIASTOLIC BLOOD PRESSURE: 80 MMHG | HEART RATE: 64 BPM | SYSTOLIC BLOOD PRESSURE: 132 MMHG | WEIGHT: 250 LBS | BODY MASS INDEX: 31.25 KG/M2

## 2019-03-29 DIAGNOSIS — I10 ESSENTIAL HYPERTENSION: ICD-10-CM

## 2019-03-29 DIAGNOSIS — I25.10 CAD S/P PERCUTANEOUS CORONARY ANGIOPLASTY: Primary | ICD-10-CM

## 2019-03-29 DIAGNOSIS — E78.2 MIXED HYPERLIPIDEMIA: ICD-10-CM

## 2019-03-29 DIAGNOSIS — Z98.61 CAD S/P PERCUTANEOUS CORONARY ANGIOPLASTY: Primary | ICD-10-CM

## 2019-03-29 DIAGNOSIS — I25.10 CAD IN NATIVE ARTERY: ICD-10-CM

## 2019-03-29 PROCEDURE — 99214 OFFICE O/P EST MOD 30 MIN: CPT | Performed by: INTERNAL MEDICINE

## 2019-03-29 PROCEDURE — 93000 ELECTROCARDIOGRAM COMPLETE: CPT | Performed by: INTERNAL MEDICINE

## 2019-03-29 RX ORDER — METOPROLOL SUCCINATE 50 MG/1
TABLET, EXTENDED RELEASE ORAL
Qty: 90 TABLET | Refills: 3 | Status: SHIPPED | OUTPATIENT
Start: 2019-03-29 | End: 2019-07-26 | Stop reason: DRUGHIGH

## 2019-04-01 NOTE — TELEPHONE ENCOUNTER
Ordering Physician:CÉSAR    Last lab's:2/18/19    Last visit :3/29/19    Next Visit :1/3/2020    Last fill: 4/5/18  Requested Prescriptions     Pending Prescriptions Disp Refills    atorvastatin (LIPITOR) 40 MG tablet [Pharmacy Med Name: ATORVASTATIN TABS 40MG] 90 tablet 3     Sig: TAKE 1 TABLET DAILY

## 2019-04-04 RX ORDER — ATORVASTATIN CALCIUM 40 MG/1
TABLET, FILM COATED ORAL
Qty: 90 TABLET | Refills: 3 | Status: SHIPPED | OUTPATIENT
Start: 2019-04-04

## 2019-05-09 RX ORDER — M-VIT,TX,IRON,MINS/CALC/FOLIC 27MG-0.4MG
1 TABLET ORAL DAILY
COMMUNITY

## 2019-05-09 NOTE — PROGRESS NOTES
Name_______________________________________Printed:____________________  Date and time of surgery___5/23/19  1030_____________________Arrival Time:___F[facility]  0900_____________   1. Do not eat or drink anything after 12 midnight (or____hours) prior to surgery. This includes no water, chewing gum or mints. Endoscopy patients follow your doctors bowel prep instructions,which may include taking part of prep after midnight. 2. Take the following pills with a small sip of water on the morning of surgery_____imdur, lisinopril, metoprolol______________________________________________                  Do not take blood pressure medications ending in pril or sartan the hilaria prior to surgery or the morning of surgery_   3. Aspirin, Ibuprofen, Advil, Naproxen, Vitamin E and other Anti-inflammatory products should be stopped for 5 days before surgery or as directed by your physician. 4. Check with your Doctor regarding stopping effient Plavix, Coumadin,Eliquis, Lovenox,Effient,Pradaxa,Xarelto, Fragmin or other blood thinners and follow their instructions. 5. Do not smoke, and do not drink any alcoholic beverages 24 hours prior to surgery. This includes NA Beer. Refrain from the usage of any recreational drugs. 6. You may brush your teeth and gargle the morning of surgery. DO NOT SWALLOW WATER   7. You MUST make arrangements for a responsible adult to stay on site while you are here and take you home after your surgery. You will not be allowed to leave alone or drive yourself home. It is strongly suggested someone stay with you the first 24 hrs. Your surgery will be cancelled if you do not have a ride home. 8. A parent/legal guardian must accompany a child scheduled for surgery and plan to stay at the hospital until the child is discharged. Please do not bring other children with you.    9. Please wear simple, loose fitting clothing to the hospital.  Do not bring valuables (money, credit cards, checkbooks, etc.) Do not

## 2019-05-23 ENCOUNTER — HOSPITAL ENCOUNTER (OUTPATIENT)
Age: 54
Setting detail: OUTPATIENT SURGERY
Discharge: HOME | End: 2019-05-23
Attending: INTERNAL MEDICINE | Admitting: INTERNAL MEDICINE
Payer: COMMERCIAL

## 2019-05-23 ENCOUNTER — ANESTHESIA EVENT (OUTPATIENT)
Dept: ENDOSCOPY | Age: 54
End: 2019-05-23
Payer: COMMERCIAL

## 2019-05-23 ENCOUNTER — ANESTHESIA (OUTPATIENT)
Dept: ENDOSCOPY | Age: 54
End: 2019-05-23
Payer: COMMERCIAL

## 2019-05-23 VITALS
WEIGHT: 255 LBS | TEMPERATURE: 97.3 F | SYSTOLIC BLOOD PRESSURE: 132 MMHG | BODY MASS INDEX: 31.71 KG/M2 | OXYGEN SATURATION: 100 % | HEART RATE: 76 BPM | DIASTOLIC BLOOD PRESSURE: 86 MMHG | RESPIRATION RATE: 16 BRPM | HEIGHT: 75 IN

## 2019-05-23 VITALS — SYSTOLIC BLOOD PRESSURE: 111 MMHG | OXYGEN SATURATION: 96 % | DIASTOLIC BLOOD PRESSURE: 73 MMHG

## 2019-05-23 LAB
GLUCOSE BLD-MCNC: 158 MG/DL (ref 70–99)
GLUCOSE BLD-MCNC: 178 MG/DL (ref 70–99)
PERFORMED ON: ABNORMAL
PERFORMED ON: ABNORMAL

## 2019-05-23 PROCEDURE — 2580000003 HC RX 258: Performed by: ANESTHESIOLOGY

## 2019-05-23 PROCEDURE — 7100000010 HC PHASE II RECOVERY - FIRST 15 MIN: Performed by: INTERNAL MEDICINE

## 2019-05-23 PROCEDURE — 6360000002 HC RX W HCPCS: Performed by: NURSE ANESTHETIST, CERTIFIED REGISTERED

## 2019-05-23 PROCEDURE — 3700000001 HC ADD 15 MINUTES (ANESTHESIA): Performed by: INTERNAL MEDICINE

## 2019-05-23 PROCEDURE — 7100000011 HC PHASE II RECOVERY - ADDTL 15 MIN: Performed by: INTERNAL MEDICINE

## 2019-05-23 PROCEDURE — 2709999900 HC NON-CHARGEABLE SUPPLY: Performed by: INTERNAL MEDICINE

## 2019-05-23 PROCEDURE — 3609010600 HC COLONOSCOPY POLYPECTOMY SNARE/COLD BIOPSY: Performed by: INTERNAL MEDICINE

## 2019-05-23 PROCEDURE — 2500000003 HC RX 250 WO HCPCS: Performed by: INTERNAL MEDICINE

## 2019-05-23 PROCEDURE — 6370000000 HC RX 637 (ALT 250 FOR IP): Performed by: INTERNAL MEDICINE

## 2019-05-23 PROCEDURE — 3700000000 HC ANESTHESIA ATTENDED CARE: Performed by: INTERNAL MEDICINE

## 2019-05-23 PROCEDURE — 2500000003 HC RX 250 WO HCPCS: Performed by: NURSE ANESTHETIST, CERTIFIED REGISTERED

## 2019-05-23 RX ORDER — SODIUM CHLORIDE 9 MG/ML
INJECTION, SOLUTION INTRAVENOUS CONTINUOUS
Status: DISCONTINUED | OUTPATIENT
Start: 2019-05-23 | End: 2019-05-23 | Stop reason: HOSPADM

## 2019-05-23 RX ORDER — PROPOFOL 10 MG/ML
INJECTION, EMULSION INTRAVENOUS PRN
Status: DISCONTINUED | OUTPATIENT
Start: 2019-05-23 | End: 2019-05-23 | Stop reason: SDUPTHER

## 2019-05-23 RX ORDER — LIDOCAINE HYDROCHLORIDE 20 MG/ML
INJECTION, SOLUTION INFILTRATION; PERINEURAL PRN
Status: DISCONTINUED | OUTPATIENT
Start: 2019-05-23 | End: 2019-05-23 | Stop reason: SDUPTHER

## 2019-05-23 RX ADMIN — SODIUM CHLORIDE: 9 INJECTION, SOLUTION INTRAVENOUS at 09:48

## 2019-05-23 RX ADMIN — PROPOFOL 50 MG: 10 INJECTION, EMULSION INTRAVENOUS at 10:20

## 2019-05-23 RX ADMIN — LIDOCAINE HYDROCHLORIDE 100 MG: 20 INJECTION, SOLUTION INFILTRATION; PERINEURAL at 10:16

## 2019-05-23 RX ADMIN — PROPOFOL 50 MG: 10 INJECTION, EMULSION INTRAVENOUS at 10:32

## 2019-05-23 RX ADMIN — PROPOFOL 50 MG: 10 INJECTION, EMULSION INTRAVENOUS at 10:39

## 2019-05-23 RX ADMIN — PROPOFOL 50 MG: 10 INJECTION, EMULSION INTRAVENOUS at 10:27

## 2019-05-23 RX ADMIN — PROPOFOL 50 MG: 10 INJECTION, EMULSION INTRAVENOUS at 10:45

## 2019-05-23 RX ADMIN — PROPOFOL 100 MG: 10 INJECTION, EMULSION INTRAVENOUS at 10:16

## 2019-05-23 ASSESSMENT — PAIN SCALES - GENERAL
PAINLEVEL_OUTOF10: 0

## 2019-05-23 ASSESSMENT — PAIN - FUNCTIONAL ASSESSMENT: PAIN_FUNCTIONAL_ASSESSMENT: 0-10

## 2019-05-23 ASSESSMENT — ENCOUNTER SYMPTOMS: SHORTNESS OF BREATH: 0

## 2019-05-23 NOTE — PROGRESS NOTES
Name:  Mylene Miller  Age:  52 y.o.  CSN:  [id number]            Past Medical History:        Diagnosis Date    CAD (coronary artery disease)     Colon polyps     Hyperlipidemia     Hypertension     Type II or unspecified type diabetes mellitus without mention of complication, not stated as uncontrolled        Past Surgical History:      Procedure Laterality Date    COLONOSCOPY  2011 2016 next    CORONARY ANGIOPLASTY WITH STENT PLACEMENT      KNEE SURGERY Right 1999    bone chip removed    PTCA      Fall 2016   63 [address]       Medications Prior to Admission:  Medications Prior to Admission: metFORMIN (GLUCOPHAGE) 1000 MG tablet, Take 1,000 mg by mouth 2 times daily (with meals)  Multiple Vitamins-Minerals (THERAPEUTIC MULTIVITAMIN-MINERALS) tablet, Take 1 tablet by mouth daily  atorvastatin (LIPITOR) 40 MG tablet, TAKE 1 TABLET DAILY  metoprolol succinate (TOPROL XL) 50 MG extended release tablet, TAKE 1 TABLET DAILY (Patient taking differently: 25 mg TAKE 1 TABLET DAILY)  ibuprofen (ADVIL;MOTRIN) 800 MG tablet, Take 1 tablet by mouth every 8 hours as needed for Pain (Take with food)  isosorbide mononitrate (IMDUR) 30 MG extended release tablet, Take 1 tablet by mouth 2 times daily  lisinopril (PRINIVIL;ZESTRIL) 20 MG tablet, TAKE 1 TABLET DAILY  JANUVIA 100 MG tablet, TAKE 1 TABLET DAILY  LEVEMIR FLEXTOUCH 100 UNIT/ML injection pen, INJECT 50 UNITS UNDER THE SKIN NIGHTLY  Blood Glucose Monitoring Suppl SUPPLIES MISC, verio IQ test strips qty 300 test tid 3 refills  glucose blood VI test strips ([employer] BLOOD GLUCOSE TEST) strip, As needed. Dulaglutide (TRULICITY) 8.04 OD/3.7UK SOPN, Inject 1.5 mg into the skin once a week   prasugrel (EFFIENT) 10 MG TABS, Take 1 tablet by mouth daily  linagliptin (TRADJENTA) 5 MG tablet, Take 1 tablet by mouth daily  aspirin 81 MG EC tablet, Take 81 mg by mouth daily     Allergies:  Patient has no known allergies.     Social History:  Social History     Socioeconomic History    Marital status:      Spouse name: Not on file    Number of children: Not on file    Years of education: Not on file    Highest education level: Not on file   Occupational History    Not on file   Social Needs    Financial resource strain: Not on file    Food insecurity:     Worry: Not on file     Inability: Not on file    Transportation needs:     Medical: Not on file     Non-medical: Not on file   Tobacco Use    Smoking status: Never Smoker    Smokeless tobacco: Never Used   Substance and Sexual Activity    Alcohol use:  Yes     Alcohol/week: 0.0 oz     Comment: rarely    Drug use: No    Sexual activity: Yes     Partners: Female   Lifestyle    Physical activity:     Days per week: Not on file     Minutes per session: Not on file    Stress: Not on file   Relationships    Social connections:     Talks on phone: Not on file     Gets together: Not on file     Attends [employer] service: Not on file     Active member of club or organization: Not on file     Attends meetings of clubs or organizations: Not on file     Relationship status: Not on file    Intimate partner violence:     Fear of current or ex partner: Not on file     Emotionally abused: Not on file     Physically abused: Not on file     Forced sexual activity: Not on file   Other Topics Concern    Not on file   Social History Narrative    Not on file       Family History:      Problem Relation Age of Onset    Cancer Mother         lung    Diabetes Father     Heart Disease Father     Alzheimer's Disease Maternal Grandmother        Vital Signs:  Vitals:    05/23/19 0924   BP: 124/89   Pulse: 78   Resp: 16   Temp: 97.6 °F (36.4 °C)   SpO2: 100%

## 2019-05-23 NOTE — ANESTHESIA POSTPROCEDURE EVALUATION
Department of Anesthesiology  Postprocedure Note    Patient: Zenobia Hayes  MRN: [id number]  YOB: 1965  Date of evaluation: 5/23/2019  Time:  11:33 AM     Procedure Summary     Date:  05/23/19 Room / Location:  [facility] ENDO 02 / [facility] ENDOSCOPY    Anesthesia Start:  1013 Anesthesia Stop:  1103    Procedure:  COLONOSCOPY POLYPECTOMY SNARE/COLD BIOPSY (N/A ) Diagnosis:       (Z12.11 - COLON CANCER SCREENING)      (Z86.010 - HISTORY OF COLON POLYPS)    Surgeon:  Reji Hirsch MD Responsible Provider:  Ellie Benson MD    Anesthesia Type:  MAC ASA Status:  3          Anesthesia Type: MAC    Constance Phase I: Constance Score: 10    Constance Phase II: Constance Score: 10    Last vitals: Reviewed and per EMR flowsheets.        Anesthesia Post Evaluation    Patient location during evaluation: PACU  Patient participation: complete - patient participated  Level of consciousness: awake and alert  Airway patency: patent  Nausea & Vomiting: no nausea and no vomiting  Complications: no  Cardiovascular status: hemodynamically stable  Respiratory status: acceptable  Hydration status: stable

## 2019-05-23 NOTE — ANESTHESIA PRE PROCEDURE
Department of Anesthesiology  Preprocedure Note       Name:  Luis Berg   Age:  52 y.o.  :  1965                                          MRN:  [id number]         Date:  2019      Surgeon: Gurinder Smith):  Latonia Garcia MD    Procedure: COLONOSCOPY (N/A )    Medications prior to admission:   Prior to Admission medications    Medication Sig Start Date End Date Taking? Authorizing Provider   metFORMIN (GLUCOPHAGE) 1000 MG tablet Take 1,000 mg by mouth 2 times daily (with meals)   Yes Historical Provider, MD   Multiple Vitamins-Minerals (THERAPEUTIC MULTIVITAMIN-MINERALS) tablet Take 1 tablet by mouth daily   Yes Historical Provider, MD   atorvastatin (LIPITOR) 40 MG tablet TAKE 1 TABLET DAILY 19  Yes Yann Moore MD   metoprolol succinate (TOPROL XL) 50 MG extended release tablet TAKE 1 TABLET DAILY  Patient taking differently: 25 mg TAKE 1 TABLET DAILY 3/29/19  Yes Yann Moore MD   ibuprofen (ADVIL;MOTRIN) 800 MG tablet Take 1 tablet by mouth every 8 hours as needed for Pain (Take with food) 19  Yes Miladys Woodruff MD   isosorbide mononitrate (IMDUR) 30 MG extended release tablet Take 1 tablet by mouth 2 times daily 18  Yes Yann Moore MD   lisinopril (PRINIVIL;ZESTRIL) 20 MG tablet TAKE 1 TABLET DAILY 18  Yes Yann Moore MD   JANUVIA 100 MG tablet TAKE 1 TABLET DAILY 16  Yes Karon Smyth DO   LEVEMIR FLEXTOUCH 100 UNIT/ML injection pen INJECT 50 UNITS UNDER THE SKIN NIGHTLY 11/5/15  Yes Az Smyth DO   Blood Glucose Monitoring Suppl SUPPLIES MISC verio IQ test strips qty 300 test tid 3 refills 14  Yes Az Smyth DO   glucose blood VI test strips ([employer] BLOOD GLUCOSE TEST) strip As needed.  13  Yes Az Smyth DO   Dulaglutide (TRULICITY) 3.17 HE/8.3IY SOPN Inject 1.5 mg into the skin once a week     Historical Provider, MD   prasugrel (EFFIENT) 10 MG TABS Take 1 tablet by mouth daily 18   Yann Moore MD   linagliptin (TRADJENTA) 5 MG tablet Take 1 tablet by mouth daily 2/2/18   Marcin Davis MD   aspirin 81 MG EC tablet Take 81 mg by mouth daily     Historical Provider, MD       Current medications:    Current Facility-Administered Medications   Medication Dose Route Frequency Provider Last Rate Last Dose    0.9 % sodium chloride infusion   Intravenous Continuous Feliciano Mcwilliams MD           Allergies:  No Known Allergies    Problem List:    Patient Active Problem List   Diagnosis Code    [facility] E11.9    Hypertension I10    Hypertension I10    CAD S/P percutaneous coronary angioplasty I25.10, Z98.61    CAD in native artery I25.10    Type 2 diabetes mellitus without complication ([facility] W27.9    Hyperlipidemia E78.5    Coronary artery disease involving native heart I25.10    Abnormal nuclear cardiac imaging test R93.1    Abnormal ECG R94.31    Angina, class III (HCC) I20.9       Past Medical History:        Diagnosis Date    CAD (coronary artery disease)     Colon polyps     Hyperlipidemia     Hypertension     Type II or unspecified type diabetes mellitus without mention of complication, not stated as uncontrolled        Past Surgical History:        Procedure Laterality Date    COLONOSCOPY  2011 2016 next    CORONARY ANGIOPLASTY WITH STENT PLACEMENT      KNEE SURGERY Right 1999    bone chip removed    PTCA      Fall 2016    TONSILLECTOMY AND ADENOIDECTOMY  1970    VASECTOMY  1986       Social History:    Social History     Tobacco Use    Smoking status: Never Smoker    Smokeless tobacco: Never Used   Substance Use Topics    Alcohol use:  Yes     Alcohol/week: 0.0 oz     Comment: rarely                                Counseling given: Not Answered      Vital Signs (Current):   Vitals:    05/09/19 1532 05/23/19 0924   BP:  124/89   Pulse:  78   Resp:  16   Temp:  97.6 °F (36.4 °C)   TempSrc:  Temporal   SpO2:  100%   Weight: 257 lb (116.6 kg) 255 lb (115.7 kg)   Height: 6' 3.5\" (1.918 m) 6' 3\" (1.905 m)                                              BP Readings from Last 3 Encounters:   05/23/19 124/89   03/29/19 132/80   02/18/19 (!) 163/101       NPO Status: Time of last liquid consumption: 0430                        Time of last solid consumption: 0800                        Date of last liquid consumption: 05/23/19(prep)                        Date of last solid food consumption: 05/22/19    BMI:   Wt Readings from Last 3 Encounters:   05/23/19 255 lb (115.7 kg)   03/29/19 250 lb (113.4 kg)   08/01/18 265 lb 3.2 oz (120.3 kg)     Body mass index is 31.87 kg/m². CBC:   Lab Results   Component Value Date    WBC 7.2 02/01/2018    RBC 4.38 02/01/2018    HGB 13.3 02/01/2018    HCT 38.8 02/01/2018    MCV 88.5 02/01/2018    RDW 12.8 02/01/2018     02/01/2018       CMP:   Lab Results   Component Value Date     02/18/2019    K 4.7 02/18/2019    CL 99 02/18/2019    CO2 17 02/18/2019    BUN 22 02/18/2019    CREATININE 0.8 02/18/2019    GFRAA >60 02/18/2019    AGRATIO 1.6 01/31/2018    LABGLOM >60 02/18/2019    GLUCOSE 275 02/18/2019    PROT 7.2 01/31/2018    CALCIUM 9.2 02/18/2019    BILITOT 0.7 01/31/2018    ALKPHOS 77 01/31/2018    AST 22 01/31/2018    ALT 23 01/31/2018       POC Tests: No results for input(s): POCGLU, POCNA, POCK, POCCL, POCBUN, POCHEMO, POCHCT in the last 72 hours.     Coags:   Lab Results   Component Value Date    PROTIME 11.0 01/31/2018    INR 0.97 01/31/2018    APTT 30.9 01/31/2018       HCG (If Applicable): No results found for: PREGTESTUR, PREGSERUM, HCG, HCGQUANT     ABGs: No results found for: PHART, PO2ART, JHW5VZW, BPN9OFL, BEART, L4DGLTOG     Type & Screen (If Applicable):  No results found for: LABABO, [facility]    Anesthesia Evaluation  Patient summary reviewed and Nursing notes reviewed no history of anesthetic complications:   Airway: Mallampati: I  TM distance: >3 FB   Neck ROM: full  Mouth opening: > = 3 FB Dental: normal exam         Pulmonary:       (-) asthma and shortness of breath                           Cardiovascular:  Exercise tolerance: good (>4 METS),   (+) hypertension:, CAD:, CABG/stent: no interval change, hyperlipidemia    (-)  angina                Neuro/Psych:               GI/Hepatic/Renal:        (-) GERD       Endo/Other:    (+) DiabetesType II DM, , .                 Abdominal:           Vascular:                                        Anesthesia Plan      MAC     ASA 3       Induction: intravenous. Anesthetic plan and risks discussed with patient. Plan discussed with CRNA.                   Esther Bowen MD   5/23/2019

## 2019-07-09 RX ORDER — PRASUGREL 10 MG/1
TABLET, FILM COATED ORAL
Qty: 90 TABLET | Refills: 1 | Status: SHIPPED | OUTPATIENT
Start: 2019-07-09

## 2019-07-26 RX ORDER — METOPROLOL SUCCINATE 25 MG/1
TABLET, EXTENDED RELEASE ORAL
Qty: 90 TABLET | Refills: 3 | Status: SHIPPED | OUTPATIENT
Start: 2019-07-26

## 2019-07-29 ENCOUNTER — TELEPHONE (OUTPATIENT)
Dept: CARDIOLOGY CLINIC | Age: 54
End: 2019-07-29

## 2020-11-03 PROBLEM — I25.10 CORONARY ARTERY DISEASE INVOLVING NATIVE HEART: Status: RESOLVED | Noted: 2018-01-31 | Resolved: 2020-11-03

## (undated) DEVICE — SOLUTION IV IRRIG WATER 500ML POUR BRL ST 2F7113

## (undated) DEVICE — ELECTRODE PT RET AD L9FT HI MOIST COND ADH HYDRGEL CORDED

## (undated) DEVICE — Device: Brand: DISPOSABLE ELECTROSURGICAL SNARE

## (undated) DEVICE — BW-412T DISP COMBO CLEANING BRUSH: Brand: SINGLE USE COMBINATION CLEANING BRUSH

## (undated) DEVICE — SET VLV 3 PC AWS DISPOSABLE GRDIAN SCOPEVALET

## (undated) DEVICE — TRAP SPEC RETRV CLR PLAS POLYP IN LN SUCT QUIK CTCH

## (undated) DEVICE — 60 ML SYRINGE,REGULAR TIP: Brand: MONOJECT

## (undated) DEVICE — PROCEDURE KIT ENDOSCP CUST